# Patient Record
Sex: FEMALE | Race: WHITE | Employment: FULL TIME | URBAN - METROPOLITAN AREA
[De-identification: names, ages, dates, MRNs, and addresses within clinical notes are randomized per-mention and may not be internally consistent; named-entity substitution may affect disease eponyms.]

---

## 2017-03-08 ENCOUNTER — TRANSFERRED RECORDS (OUTPATIENT)
Dept: HEALTH INFORMATION MANAGEMENT | Facility: CLINIC | Age: 22
End: 2017-03-08

## 2017-05-17 ENCOUNTER — TRANSFERRED RECORDS (OUTPATIENT)
Dept: HEALTH INFORMATION MANAGEMENT | Facility: CLINIC | Age: 22
End: 2017-05-17

## 2017-09-03 ENCOUNTER — HEALTH MAINTENANCE LETTER (OUTPATIENT)
Age: 22
End: 2017-09-03

## 2018-02-09 ENCOUNTER — TRANSFERRED RECORDS (OUTPATIENT)
Dept: HEALTH INFORMATION MANAGEMENT | Facility: CLINIC | Age: 23
End: 2018-02-09

## 2019-03-05 ENCOUNTER — PRE VISIT (OUTPATIENT)
Dept: UROLOGY | Facility: CLINIC | Age: 24
End: 2019-03-05

## 2019-03-05 NOTE — TELEPHONE ENCOUNTER
Reason for visit: Establish care for Chaitanya     Relevant information: pt scheduled 3/5/19    Records/imaging/labs/orders: Records not yet requested    Pt called: no need for a call    At Rooming: dip/pvr

## 2019-03-06 ENCOUNTER — PRE VISIT (OUTPATIENT)
Dept: UROLOGY | Facility: CLINIC | Age: 24
End: 2019-03-06

## 2019-03-06 NOTE — TELEPHONE ENCOUNTER
MEDICAL RECORDS REQUEST   [facility]  Urology Clinic  [address] [place] [address]  PHONE: [phone]  Fax: [phone]        FUTURE VISIT INFORMATION                                                   Kimmie Vyas : 1995 scheduled for future visit at [facility]    APPOINTMENT INFORMATION:    Date: 2019    Provider:  JC CHAMBERLAIN    Reason for Visit/Diagnosis: CONSULT FOR UTI    REFERRAL INFORMATION:    Referring provider:  MEENAKSHI MENDOZA    Specialty: NP    Referring providers clinic:  [facility] contact number:  [phone]    RECORDS REQUESTED FOR VISIT                                                     NOTES  STATUS/DETAILS   OFFICE NOTE from referring provider  IN PROCESS   OFFICE NOTE from other specialist  no   DISCHARGE SUMMARY from hospital  no   DISCHARGE REPORT from the ER  no   OPERATIVE REPORT  no   MEDICATION LIST  in process       PRE-VISIT CHECKLIST      Record collection complete If no, please explain REQUEST SENT  APPT MADE 2019 NO TIME TO SENT PATIENT MARNIE, HAVE TO BE SIGN WHEN PATIENT ARRIVE   Appointment appropriately scheduled           (right time/right provider) Yes   MyChart activation If no, please explain IN PROCESS   Questionnaire complete If no, please explain IN PROCESS     Completed by: Jc Leon

## 2019-03-07 ENCOUNTER — OFFICE VISIT (OUTPATIENT)
Dept: UROLOGY | Facility: CLINIC | Age: 24
End: 2019-03-07
Payer: COMMERCIAL

## 2019-03-07 VITALS
WEIGHT: 140.3 LBS | BODY MASS INDEX: 23.38 KG/M2 | DIASTOLIC BLOOD PRESSURE: 66 MMHG | HEIGHT: 65 IN | SYSTOLIC BLOOD PRESSURE: 126 MMHG | HEART RATE: 81 BPM

## 2019-03-07 DIAGNOSIS — Z87.440 PERSONAL HISTORY OF URINARY TRACT INFECTION: Primary | ICD-10-CM

## 2019-03-07 RX ORDER — CIPROFLOXACIN 500 MG/1
500 TABLET, FILM COATED ORAL 2 TIMES DAILY
COMMUNITY
End: 2019-05-13

## 2019-03-07 ASSESSMENT — MIFFLIN-ST. JEOR: SCORE: 1382.28

## 2019-03-07 ASSESSMENT — PAIN SCALES - GENERAL: PAINLEVEL: NO PAIN (0)

## 2019-03-07 NOTE — PROGRESS NOTES
"March 7, 2019    Referring Provider: Self referred    Primary Care Provider: Does not have one    CC: UTI    HPI:  Kimmie Oconnor is a 23 year old female who presents for evaluation of her pelvic floor symptoms.  She reports that her urinary tract symptoms started in February 2017 when she got .    Gets dysuria, gross hematuria, urgency frequency small voids.  Can get nauseated, feverish and chills.  Never hospitalized for one.  She notes that the symptoms are often related to intercourse but not always.  Thinks she has maybe 15 UTIs in the past two years.  She thinks that urine culture was only done about five times.    Was in [place] in November and bought Ciprofloxacin over there and uses it when she has symptoms of UTI    She was seen at [facility] in 2017 and given a \"large jar of nitrofurantoin\"    No prior history of UTI, constipation, pelvic surgeries.  She does not think she could be pregnant but not trying to prevent.  She is hoping to get pregnant within the next year.    No past medical history on file.    No past surgical history on file.    Social History     Socioeconomic History     Marital status:      Spouse name: Not on file     Number of children: Not on file     Years of education: Not on file     Highest education level: Not on file   Occupational History     Not on file   Social Needs     Financial resource strain: Not on file     Food insecurity:     Worry: Not on file     Inability: Not on file     Transportation needs:     Medical: Not on file     Non-medical: Not on file   Tobacco Use     Smoking status: Never Smoker     Smokeless tobacco: Never Used   Substance and Sexual Activity     Alcohol use: Not on file     Drug use: Not on file     Sexual activity: Not on file   Lifestyle     Physical activity:     Days per week: Not on file     Minutes per session: Not on file     Stress: Not on file   Relationships     Social connections:     Talks on phone: Not on file     Gets " "together: Not on file     Attends [employer] service: Not on file     Active member of club or organization: Not on file     Attends meetings of clubs or organizations: Not on file     Relationship status: Not on file     Intimate partner violence:     Fear of current or ex partner: Not on file     Emotionally abused: Not on file     Physically abused: Not on file     Forced sexual activity: Not on file   Other Topics Concern     Parent/sibling w/ CABG, MI or angioplasty before 65F 55M? No   Social History Narrative     Not on file     No family history on file.    Review of Systems     All other systems reviewed and are negative.      No Known Allergies    Current Outpatient Medications   Medication     ciprofloxacin (CIPRO) 500 MG tablet     NO ACTIVE MEDICATIONS     No current facility-administered medications for this visit.      /66   Pulse 81   Ht 1.651 m (5' 5\")   Wt 63.6 kg (140 lb 4.8 oz)   BMI 23.35 kg/m   No LMP recorded. Body mass index is 23.35 kg/m .  She is alert and oriented.  She is well groomed, comfortable in no acute distress. Normal mood and affect.   Non-labored breathing. Normocephalic without masses, lesions, obvious abnormalities. Abdomen is soft, non-tender, non-distended, no CVAT.  .  Skin dry, warm to touch. No lower extremity edema.  Full ROM in extremities with normal gait.      Urine dip not done as she is on antibiotics    PVR 0 mL by bladder scan    A/P: Kimmie Oconnor is a 23 year old F with history of UTI    Recommend that she stop the Ciprofloxacin as she has already been on it for one week.  Discussed that she should be cautious about using this medication if she is considering pregnancy    Discussed the importance of actually doing a urine culture if there is concern for UTI    Renal US to assess for obvious stones or pathology    Discussed given the hematuria it would be reasonable to consider cystoscopy.  We did discuss that given her age the likelihood of malignancy " or anything concerning would be low, but would consider if culture proven infections or symptoms continue    45 minutes were spent with the patient today, > 50% in counseling and coordination of care    Allie Duarte MD MPH    Urology    CC  Patient Care Team:  Allie Duarte MD as MD (Urology)  Georgina Deal, RN as Specialty Care Coordinator (Urology)  SELF, REFERRED

## 2019-03-07 NOTE — LETTER
"  RE: Kimmie Oconnor  [address] [place] [address]     Dear Colleague,    Thank you for referring your patient, Kimmie Oconnor, to the [facility]OLOG[facility] AND [facility] FOR PROSTATE AND UROLOGIC CANCERS at [facility]. Please see a copy of my visit note below.    March 7, 2019    Referring Provider: Self referred    Primary Care Provider: Does not have one    CC: UTI    HPI:  Kimmie Oconnor is a 23 year old female who presents for evaluation of her pelvic floor symptoms.  She reports that her urinary tract symptoms started in February 2017 when she got .    Gets dysuria, gross hematuria, urgency frequency small voids.  Can get nauseated, feverish and chills.  Never hospitalized for one.  She notes that the symptoms are often related to intercourse but not always.  Thinks she has maybe 15 UTIs in the past two years.  She thinks that urine culture was only done about five times.    Was in [place] in November and bought Ciprofloxacin over there and uses it when she has symptoms of UTI    She was seen at [facility] in 2017 and given a \"large jar of nitrofurantoin\"    No prior history of UTI, constipation, pelvic surgeries.  She does not think she could be pregnant but not trying to prevent.  She is hoping to get pregnant within the next year.    No past medical history on file.    No past surgical history on file.    Social History     Socioeconomic History     Marital status:      Spouse name: Not on file     Number of children: Not on file     Years of education: Not on file     Highest education level: Not on file   Occupational History     Not on file   Social Needs     Financial resource strain: Not on file     Food insecurity:     Worry: Not on file     Inability: Not on file     Transportation needs:     Medical: Not on file     Non-medical: Not on file   Tobacco Use     Smoking status: Never Smoker     Smokeless tobacco: Never Used   Substance and Sexual " "Activity     Alcohol use: Not on file     Drug use: Not on file     Sexual activity: Not on file   Lifestyle     Physical activity:     Days per week: Not on file     Minutes per session: Not on file     Stress: Not on file   Relationships     Social connections:     Talks on phone: Not on file     Gets together: Not on file     Attends Confucianism service: Not on file     Active member of club or organization: Not on file     Attends meetings of clubs or organizations: Not on file     Relationship status: Not on file     Intimate partner violence:     Fear of current or ex partner: Not on file     Emotionally abused: Not on file     Physically abused: Not on file     Forced sexual activity: Not on file   Other Topics Concern     Parent/sibling w/ CABG, MI or angioplasty before 65F 55M? No   Social History Narrative     Not on file     No family history on file.    No Known Allergies    Current Outpatient Medications   Medication     ciprofloxacin (CIPRO) 500 MG tablet     NO ACTIVE MEDICATIONS     No current facility-administered medications for this visit.      /66   Pulse 81   Ht 1.651 m (5' 5\")   Wt 63.6 kg (140 lb 4.8 oz)   BMI 23.35 kg/m    No LMP recorded. Body mass index is 23.35 kg/m .  She is alert and oriented.  She is well groomed, comfortable in no acute distress. Normal mood and affect.   Non-labored breathing. Normocephalic without masses, lesions, obvious abnormalities. Abdomen is soft, non-tender, non-distended, no CVAT.  .  Skin dry, warm to touch. No lower extremity edema.  Full ROM in extremities with normal gait.      Urine dip not done as she is on antibiotics    PVR 0 mL by bladder scan    A/P: Kimmie Oconnor is a 23 year old F with history of UTI    Recommend that she stop the Ciprofloxacin as she has already been on it for one week.  Discussed that she should be cautious about using this medication if she is considering pregnancy    Discussed the importance of actually doing a urine " culture if there is concern for UTI    Renal US to assess for obvious stones or pathology    Discussed given the hematuria it would be reasonable to consider cystoscopy.  We did discuss that given her age the likelihood of malignancy or anything concerning would be low, but would consider if culture proven infections or symptoms continue    45 minutes were spent with the patient today, > 50% in counseling and coordination of care    Allie Duarte MD MPH    Urology    CC  Patient Care Team:  Allie Duarte MD as MD (Urology)  Georgina Deal, RN as Specialty Care Coordinator (Urology)  SELF, REFERRED

## 2019-03-07 NOTE — PATIENT INSTRUCTIONS
Would recommend that you stop the Ciprofloxacin    Supplements to prevent UTI  -probiotics example Align  -cranberry   Ellura: [email or web address]   Theracran HP by [employer]  -d-mannose    Please do the renal ultrasound    Please let us know if you have a urine infection and come in for a urine test to a  [facility] lab preferably   [phone] or [phone] during business hours  [phone] and ask for the urology resident on call    It was a pleasure meeting with you today.  Thank you for allowing me and my team the privilege of caring for you today.  YOU are the reason we are here, and I truly hope we provided you with the excellent service you deserve.  Please let us know if there is anything else we can do for you so that we can be sure you are leaving completely satisfied with your care experience.    Cystoscopy    Cystoscopy is a procedure that lets your doctor look directly inside your urethra and bladder. It can be used to:    Help diagnose a problem with your urethra, bladder, or kidneys.    Take a sample (biopsy) of bladder or urethral tissue.    Treat certain problems (such as removing kidney stones).    Place a stent to bypass an obstruction.    Take special X-rays of the kidneys.  Based on the findings, your doctor may recommend other tests or treatments.  What is a cystoscope?  A cystoscope is a telescope-like instrument that contains lenses and fiberoptics (small glass wires that make bright light). The cystoscope may be straight and rigid, or flexible to bend around curves in the urethra. The doctor may look directly into the cystoscope, or project the image onto a monitor.  Getting ready    Ask your doctor if you should stop taking any medicines before the procedure.    Follow any other instructions your doctor gives you.  Tell your doctor before the exam if you:    Take any medicines, such as aspirin or blood thinners    Have allergies to any medicines    Are pregnant   The  procedure  Cystoscopy is done in the doctor s office, surgery center, or hospital. The doctor and a nurse are present during the procedure. It takes only a few minutes, longer if a biopsy, X-ray, or treatment needs to be done.  During the procedure:    You lie on an exam table on your back, knees bent and legs apart. You are covered with a drape.    Your urethra and the area around it are washed. Anesthetic jelly may be applied to numb the urethra.    The cystoscope is inserted. A sterile fluid is put into the bladder to expand it. You may feel pressure from this fluid.    When the procedure is done, the cystoscope is removed.  After the procedure   Once you re home:    Drink plenty of fluids.    You may have burning or light bleeding when you urinate--this is normal.    Medicines may be prescribed to ease any discomfort or prevent infection. Take these as directed.    Call your doctor if you have heavy bleeding or blood clots, burning that lasts more than a day, a fever over 100 F  (38  C), or trouble urinating.  Date Last Reviewed: 1/1/2017 2000-2017 [employer]. [address] [place] [address]. All rights reserved. This information is not intended as a substitute for professional medical care. Always follow your healthcare professional's instructions.

## 2019-03-07 NOTE — NURSING NOTE
"Chief Complaint   Patient presents with     Consult     Chaitanya consult       Blood pressure 126/66, pulse 81, height 1.651 m (5' 5\"), weight 63.6 kg (140 lb 4.8 oz), not currently breastfeeding. Body mass index is 23.35 kg/m .    There is no problem list on file for this patient.      No Known Allergies    Current Outpatient Medications   Medication Sig Dispense Refill     ciprofloxacin (CIPRO) 500 MG tablet Take 500 mg by mouth 2 times daily       NO ACTIVE MEDICATIONS          Social History     Tobacco Use     Smoking status: Never Smoker     Smokeless tobacco: Never Used   Substance Use Topics     Alcohol use: Not on file     Drug use: Not on file       ANN Aguilar  3/7/2019  8:12 AM     "

## 2019-03-13 ENCOUNTER — ANCILLARY PROCEDURE (OUTPATIENT)
Dept: ULTRASOUND IMAGING | Facility: CLINIC | Age: 24
End: 2019-03-13
Attending: UROLOGY
Payer: COMMERCIAL

## 2019-03-13 DIAGNOSIS — Z87.440 PERSONAL HISTORY OF URINARY TRACT INFECTION: ICD-10-CM

## 2019-03-13 PROCEDURE — 76770 US EXAM ABDO BACK WALL COMP: CPT | Performed by: RADIOLOGY

## 2019-03-25 ENCOUNTER — TELEPHONE (OUTPATIENT)
Dept: UROLOGY | Facility: CLINIC | Age: 24
End: 2019-03-25

## 2019-03-25 DIAGNOSIS — N39.0 URINARY TRACT INFECTION WITHOUT HEMATURIA, SITE UNSPECIFIED: Primary | ICD-10-CM

## 2019-03-25 DIAGNOSIS — N39.0 URINARY TRACT INFECTION: ICD-10-CM

## 2019-03-25 DIAGNOSIS — N39.0 URINARY TRACT INFECTION WITHOUT HEMATURIA, SITE UNSPECIFIED: ICD-10-CM

## 2019-03-25 LAB

## 2019-03-25 PROCEDURE — 81001 URINALYSIS AUTO W/SCOPE: CPT | Performed by: UROLOGY

## 2019-03-25 PROCEDURE — 87086 URINE CULTURE/COLONY COUNT: CPT | Performed by: UROLOGY

## 2019-03-25 PROCEDURE — 87109 MYCOPLASMA: CPT | Performed by: UROLOGY

## 2019-03-25 PROCEDURE — 87109 MYCOPLASMA: CPT | Mod: 91 | Performed by: UROLOGY

## 2019-03-25 NOTE — TELEPHONE ENCOUNTER
[facility] Call Center    Phone Message    May a detailed message be left on voicemail: yes    Reason for Call: Other: Pt calling asking for UA/UC orders to be placed. She would appreciate a call back when orders have been submitted so she can schedule.     Action Taken: Message routed to:  [facility] UC URO AND PROSTATE

## 2019-03-26 DIAGNOSIS — N39.0 URINARY TRACT INFECTION: Primary | ICD-10-CM

## 2019-03-26 LAB
BACTERIA SPEC CULT: NO GROWTH
SPECIMEN SOURCE: NORMAL

## 2019-03-27 ENCOUNTER — TELEPHONE (OUTPATIENT)
Dept: UROLOGY | Facility: CLINIC | Age: 24
End: 2019-03-27

## 2019-03-27 NOTE — TELEPHONE ENCOUNTER
Patient would like to know if she can wait until her labs are done and see what the results are and decide if she should continue with having the ct urogram done.     Georgina Deal RN   Care Coordinator Urology

## 2019-03-27 NOTE — TELEPHONE ENCOUNTER
----- Message from Allie Duarte MD sent at 3/27/2019  1:34 PM CDT -----  Renal US is normal but I am unsure what to make of the length discrepancy in but do not think that it is contributing to her pain.  Her urine tests don't seem to be all back yet .    Otherwise we could consider CT urogram and return for office cystoscopy    ----- Message -----  From: Georgina Deal RN  Sent: 3/27/2019   1:29 PM  To: Allie Duarte MD    Did you look at the her COCO? Done 3/7/19  She is still in pain and wants to know what we are doing.  Please review    Let me know  Georgina

## 2019-03-27 NOTE — TELEPHONE ENCOUNTER
Patient was notified by christopher Deal RNCC for Dr. Allie Duarte that her UC was normal.      Tiffanie Chacko MA

## 2019-04-03 LAB
BACTERIA SPEC CULT: NORMAL
BACTERIA SPEC CULT: NORMAL
SPECIMEN SOURCE: NORMAL
SPECIMEN SOURCE: NORMAL

## 2019-04-04 DIAGNOSIS — N39.0 RECURRENT UTI: Primary | ICD-10-CM

## 2019-04-04 NOTE — PROGRESS NOTES
Patient notified of ct urogram and appt  To figure out why she has green urine and uti symptoms.  She was updated on her negative myco-ureaplasma. Patient agreed with plan.    Georgina Deal RN   Care Coordinator Urology

## 2019-05-13 ENCOUNTER — OFFICE VISIT (OUTPATIENT)
Dept: PEDIATRICS | Facility: CLINIC | Age: 24
End: 2019-05-13
Payer: COMMERCIAL

## 2019-05-13 VITALS
WEIGHT: 139.8 LBS | TEMPERATURE: 98.8 F | HEIGHT: 65 IN | BODY MASS INDEX: 23.29 KG/M2 | SYSTOLIC BLOOD PRESSURE: 126 MMHG | OXYGEN SATURATION: 98 % | RESPIRATION RATE: 18 BRPM | HEART RATE: 102 BPM | DIASTOLIC BLOOD PRESSURE: 70 MMHG

## 2019-05-13 DIAGNOSIS — J06.9 UPPER RESPIRATORY TRACT INFECTION, UNSPECIFIED TYPE: ICD-10-CM

## 2019-05-13 DIAGNOSIS — Z32.00 PREGNANCY EXAMINATION OR TEST, PREGNANCY UNCONFIRMED: Primary | ICD-10-CM

## 2019-05-13 LAB — HCG UR QL: POSITIVE

## 2019-05-13 PROCEDURE — 81025 URINE PREGNANCY TEST: CPT | Performed by: FAMILY MEDICINE

## 2019-05-13 PROCEDURE — 99213 OFFICE O/P EST LOW 20 MIN: CPT | Performed by: FAMILY MEDICINE

## 2019-05-13 RX ORDER — PRENATAL VIT/IRON FUM/FOLIC AC 27MG-0.8MG
1 TABLET ORAL DAILY
Qty: 30 TABLET | Refills: 11 | COMMUNITY
Start: 2019-05-13

## 2019-05-13 ASSESSMENT — MIFFLIN-ST. JEOR: SCORE: 1385.62

## 2019-05-13 ASSESSMENT — PAIN SCALES - GENERAL: PAINLEVEL: NO PAIN (0)

## 2019-05-13 NOTE — PATIENT INSTRUCTIONS

## 2019-05-13 NOTE — PROGRESS NOTES
"  SUBJECTIVE:   Kimmie Oconnor is a 23 year old female who presents to clinic today for the following   health issues:          Pregnancy confirmation on today visit.    Pt states that she is here to confirm a pregnancy test on today visit. Pt states that her last pregnancy test at home was done the 05/10/2019. Pt states that her last period or menstrual cycle was 04/10/2019 on today visit.      She also complains of tiredness, rhinorrhea, without cough, fever or chills. Appetite is a little decreased but urine output is within normal limits.    Additional history: as documented    Reviewed  and updated as needed this visit by clinical staff  Tobacco  Allergies  Meds  Problems  Med Hx  Surg Hx  Fam Hx         Reviewed and updated as needed this visit by Provider  Tobacco  Allergies  Meds  Problems  Med Hx  Surg Hx  Fam Hx         There is no problem list on file for this patient.    History reviewed. No pertinent surgical history.    Social History     Tobacco Use     Smoking status: Never Smoker     Smokeless tobacco: Never Used   Substance Use Topics     Alcohol use: Not on file     History reviewed. No pertinent family history.      Current Outpatient Medications   Medication Sig Dispense Refill     Prenatal Vit-Fe Fumarate-FA (PRENATAL MULTIVITAMIN W/IRON) 27-0.8 MG tablet Take 1 tablet by mouth daily 30 tablet 11     NO ACTIVE MEDICATIONS          ROS:  Constitutional, HEENT, cardiovascular, pulmonary, Neuro, dermatology,  gi and gu systems are negative, except as otherwise noted.    OBJECTIVE:     /70 (BP Location: Right arm, Patient Position: Sitting, Cuff Size: Adult Large)   Pulse 102   Temp 98.8  F (37.1  C) (Temporal)   Resp 18   Ht 1.66 m (5' 5.35\")   Wt 63.4 kg (139 lb 12.8 oz)   SpO2 98%   BMI 23.01 kg/m    Body mass index is 23.01 kg/m .   GENERAL: healthy, alert and no distress  EYES: Eyes grossly normal to inspection, PERRL and conjunctivae and sclerae normal  HENT: ear " canals and TM's normal, nose and mouth without ulcers or lesions  NECK: no adenopathy, no asymmetry, masses, or scars and thyroid normal to palpation  RESP: lungs clear to auscultation - no rales, rhonchi or wheezes  CV: regular rate and rhythm, normal S1 S2, no S3 or S4, no murmur, click or rub, no peripheral edema and peripheral pulses strong  ABDOMEN: soft, nontender, no hepatosplenomegaly, no masses and bowel sounds normal      ASSESSMENT/PLAN:   Kimmie was seen today for recheck.    Diagnoses and all orders for this visit:    Pregnancy examination or test, pregnancy unconfirmed  -     HCG Qual, Urine ([id number])  Prenatal vitamins daily.  Given first trimester precautions/warning signs.  Encouraged healthy lifestyle and avoidance of alcohol, drugs, and caffeine.  All questions answered.  Given all options for OB care, including family practice, OB/Gyn, and midwives.  Her first OB appointment should optimally be between 8 and 12 weeks ( patient has an already scheduled 10 week first OB visit)  Call or come in for concerns that arise sooner.     Upper respiratory tract infection, unspecified type  Symptomatic therapy suggested: push fluids, use vaporizer or mist needed  and use acetaminophen as needed. Discussed safe use of cetirizine or loratadine.        James Man MD  [facility]

## 2019-05-23 ENCOUNTER — TELEPHONE (OUTPATIENT)
Dept: OBGYN | Facility: OTHER | Age: 24
End: 2019-05-23

## 2019-05-23 DIAGNOSIS — R35.0 URINARY FREQUENCY: ICD-10-CM

## 2019-05-23 NOTE — TELEPHONE ENCOUNTER
I placed an order for a urine test.      Also:  Treatment of Nausea and Vomiting in Pregnancy    Stop prenatal vitamin and start a folic acid supplement only until nausea improves (folic acid 400 micrograms by mouth daily)    Ginger capsules 250 mg by mouth daily     Consider acupressure with wrist bands    Vitamin B6 (pyridoxine) 10-25 mg by mouth 3-4 times per day.  This may be taken in combination with doxylamine.  Doxylamine 25 mg by mouth every night before bed.  You may also take doxylamine 12.5 mg (half tab) in the am and in the afternoon as needed.      It is important to stay hydrated.  Please let us know if your symptoms worsen or do not improve with the treatment plan listed above.

## 2019-05-23 NOTE — TELEPHONE ENCOUNTER
Called pt to discuss Dr. Mayfield note. Pt verbalized understanding and had no further questions or concerns.   Writer scheduled pt for her lab only appointment.      Nayana Bran RN on 5/23/2019 at 1:47 PM

## 2019-05-23 NOTE — TELEPHONE ENCOUNTER
Nausea, can eat crackers and drink sprite and water.   Has thrown up 6x/3days. Can keep food and liquid down.   Discussed small frequent meals, bland foods, what foods to avoid.    Pt has had UTI symptoms for years they come and go. Pt saw a Dr. Duarte at the [facility], Urology.   pt didn't have an UTI at the most recently urine testing 3/25/2019. Dr. Duarte advised that pt get an MRI since Chronic problem for pt. Since pt is pregnant she cannot do this.     Itching and burning when pt pee's symptoms are improving. Symptoms were worse this past Monday.   Routing to provider to advise on a lab only appointment?     Nayana Bran RN on 5/23/2019 at 11:48 AM

## 2019-05-24 DIAGNOSIS — R35.0 URINARY FREQUENCY: ICD-10-CM

## 2019-05-24 LAB
ALBUMIN UR-MCNC: 10 MG/DL
APPEARANCE UR: CLEAR
BACTERIA #/AREA URNS HPF: ABNORMAL /HPF
BILIRUB UR QL STRIP: NEGATIVE
COLOR UR AUTO: YELLOW
GLUCOSE UR STRIP-MCNC: NEGATIVE MG/DL
HGB UR QL STRIP: ABNORMAL
KETONES UR STRIP-MCNC: 10 MG/DL
LEUKOCYTE ESTERASE UR QL STRIP: NEGATIVE
MUCOUS THREADS #/AREA URNS LPF: PRESENT /LPF
NITRATE UR QL: NEGATIVE
NON-SQ EPI CELLS #/AREA URNS LPF: ABNORMAL /LPF
PH UR STRIP: 6 PH (ref 5–7)
RBC #/AREA URNS AUTO: ABNORMAL /HPF
SOURCE: ABNORMAL
SP GR UR STRIP: 1.01 (ref 1–1.03)
UROBILINOGEN UR STRIP-MCNC: NORMAL MG/DL (ref 0–2)
WBC #/AREA URNS AUTO: ABNORMAL /HPF

## 2019-05-24 PROCEDURE — 87086 URINE CULTURE/COLONY COUNT: CPT | Performed by: OBSTETRICS & GYNECOLOGY

## 2019-05-24 PROCEDURE — 81001 URINALYSIS AUTO W/SCOPE: CPT | Performed by: OBSTETRICS & GYNECOLOGY

## 2019-05-24 NOTE — TELEPHONE ENCOUNTER
Patient called today stating she vomited up the medication she took this morning. Patient has recently switched medications per Dr. Wise's recommendations and stopped the prenatal vitamin and started a folic acid supplement only until nausea improves, Brenda capsules, and Vitamin B6. She was unable to to find the recommended 250 mg brenda capsules so Dr. Wise ok'd the 550 mg that she found OTC.     RN gave recommendations to not take medication again today and let her stomach rest. She could try taking the medications again tomorrow with the recommendations of eating small amount of food, take small sips of water and not taking all the medications at once. She also states she hungry at night, told her she could keep crackers at her bedside.    Patient also had concerns of constipation. RN recommended staying hydrated, staying active, eating fruits and veggies as tolerated and told her she could try colace or senna as well.     Patient verbalized understanding and agreed to plan.   No further questions.     Porsche Aguero RN on 5/24/2019 at 9:26 AM

## 2019-05-25 LAB
BACTERIA SPEC CULT: NORMAL
SPECIMEN SOURCE: NORMAL

## 2019-05-30 ENCOUNTER — TELEPHONE (OUTPATIENT)
Dept: OBGYN | Facility: OTHER | Age: 24
End: 2019-05-30

## 2019-05-30 DIAGNOSIS — O21.9 NAUSEA AND VOMITING IN PREGNANCY: Primary | ICD-10-CM

## 2019-05-30 NOTE — TELEPHONE ENCOUNTER
"Patient is calling again today with continued nausea and vomiting. RN spoke with patient last week with recards to her vomiting. Dr. Wise made recommendations to switch up medications (stopped the prenatal vitamin and started a folic acid supplement only until nausea improves, Brenda capsules, and Vitamin B6) and patient states she was feeling\"somehwat candido\" over the weekend, then the last two days she was having \"normal morning sickness\" where is was nauseated and vomiting until 10 am and then just nauseated. However, since dinner last night she's been increasingly nauseated, and since 6 am this morning she has been vomiting. In the 10 minutes on the phone with RN this afternoon she vomited. Patient states she had started taking her prenatal vitamin again before bed so that could possibly be contributing. Today she has tried taking small sips of water, eating bland foods and crackers throughout the day but cannot keep anything down. She states she is \"nervous that she may pass out\". She is mildly fatigued, not able to keep down medications, urine is pale in color. Is is thirsty but denies dry mouth or skin upon her assessment.      RN recommends that patient go to ED to be further assessed for dehydration and see if they can prescribe anything to help further with nausea and vomiting. RN will route concerns to Dr. Wise as well for further recommendations.      Patient verbalized understanding and agreed to plan.   No further questions.      Porsche Aguero RN on 5/30/2019 at 5:01 PM      "

## 2019-05-31 RX ORDER — ONDANSETRON 4 MG/1
4 TABLET, ORALLY DISINTEGRATING ORAL EVERY 8 HOURS PRN
Qty: 20 TABLET | Refills: 1 | Status: SHIPPED | OUTPATIENT
Start: 2019-05-31

## 2019-05-31 NOTE — TELEPHONE ENCOUNTER
This was sent in.  Please notify patient.    Zofran can be quite constipating so I recommend she consider a stool softener and stay well hydrated.  Zofran is commonly used for nausea in pregnancy, but has not been well studied.  She should use it sparingly and continue the other care measures first as previously discussed.

## 2019-05-31 NOTE — TELEPHONE ENCOUNTER
Pt went to ER last night, received IV fluids x1, and IV Zofran.   Pt feels better now, pt is able to eat and keep food and water down so far this morning. Advised pt to stop taking prenatal per Dr. Wise's request.     Pt would like Zofran, please send to selected pharmacy.   Routing to provider to add order of Zofran.     Nayana Bran RN on 5/31/2019 at 8:06 AM

## 2019-05-31 NOTE — TELEPHONE ENCOUNTER
Called pt inform her that Dr. Wise has sent her prescription for Zofran. Writer also informed pt of the rest of Dr. Wise's note.   Writer advised trying seabands as well, aromatherapy such as mandarin, silvia, and mint. Pt verbalized understanding and had no further questions or concerns.     Nayana Bran RN on 5/31/2019 at 9:41 AM

## 2019-05-31 NOTE — TELEPHONE ENCOUNTER
If patient recently restarted her prenatal she should stop.  Please call patient and see how she is doing.  If she is interested, I can send in a prescription for nausea medication.

## 2019-05-31 NOTE — TELEPHONE ENCOUNTER
Patient called stating she has taken Zofran once today at 1400. She then ate right after taking medication at 1500 and 1600 and then proceeded to puke twice today after each meal.    Patient tried eating crackers,  bland nuts, tried fruit smoothie (pineapple, banana, canalope), hard whites of eggs, has been having water. Recommended spacing meals out and eating small portions. Advised not to take double dose of Zofran like patient wanted to. Told her Zofran should help with the nausea and vomiting but it's a cure-all. Told her to rest, and try the previous recommendations over the weekend and then call and update us on Monday. If no improvement we would reach out to provider again for further recommendations. Discussed that patient may be one who experiences hyperemesis throughout pregnancy and that it is important to stay hydrated and rest as able.     Patient verbalized understanding and agreed to plan.   Will update us on Monday June 3rd.     Porsche Aguero RN on 5/31/2019 at 5:14 PM

## 2019-06-03 ENCOUNTER — PRE VISIT (OUTPATIENT)
Dept: UROLOGY | Facility: CLINIC | Age: 24
End: 2019-06-03

## 2019-06-03 ENCOUNTER — TELEPHONE (OUTPATIENT)
Dept: OBGYN | Facility: OTHER | Age: 24
End: 2019-06-03

## 2019-06-03 NOTE — TELEPHONE ENCOUNTER
Pt calling to let us know pt is feeling fine. Pt felt better this weekend and is starting to feel like herself.     Nayana Bran RN on 6/3/2019 at 11:58 AM

## 2019-06-03 NOTE — TELEPHONE ENCOUNTER
Reason for visit: 3 month follow up     Relevant information: Pelvic floor symptoms    Records/imaging/labs/orders: pt is pregnant     Pt called: no need for a call    At Rooming: regular

## 2019-06-06 ENCOUNTER — TELEPHONE (OUTPATIENT)
Dept: OBGYN | Facility: OTHER | Age: 24
End: 2019-06-06

## 2019-06-06 ENCOUNTER — OFFICE VISIT (OUTPATIENT)
Dept: OBGYN | Facility: OTHER | Age: 24
End: 2019-06-06
Payer: COMMERCIAL

## 2019-06-06 VITALS
SYSTOLIC BLOOD PRESSURE: 118 MMHG | BODY MASS INDEX: 21.81 KG/M2 | DIASTOLIC BLOOD PRESSURE: 60 MMHG | WEIGHT: 132.5 LBS | HEART RATE: 72 BPM

## 2019-06-06 DIAGNOSIS — R82.90 NONSPECIFIC FINDING ON EXAMINATION OF URINE: ICD-10-CM

## 2019-06-06 DIAGNOSIS — R30.0 DYSURIA: Primary | ICD-10-CM

## 2019-06-06 DIAGNOSIS — Z32.01 PREGNANCY TEST POSITIVE: ICD-10-CM

## 2019-06-06 LAB
ALBUMIN UR-MCNC: NEGATIVE MG/DL
AMORPH CRY #/AREA URNS HPF: ABNORMAL /HPF
APPEARANCE UR: ABNORMAL
BACTERIA #/AREA URNS HPF: ABNORMAL /HPF
BILIRUB UR QL STRIP: NEGATIVE
COLOR UR AUTO: YELLOW
GLUCOSE UR STRIP-MCNC: NEGATIVE MG/DL
HGB UR QL STRIP: ABNORMAL
KETONES UR STRIP-MCNC: NEGATIVE MG/DL
LEUKOCYTE ESTERASE UR QL STRIP: ABNORMAL
NITRATE UR QL: POSITIVE
NON-SQ EPI CELLS #/AREA URNS LPF: ABNORMAL /LPF
PH UR STRIP: 6.5 PH (ref 5–7)
RBC #/AREA URNS AUTO: ABNORMAL /HPF
SOURCE: ABNORMAL
SP GR UR STRIP: 1.01 (ref 1–1.03)
UROBILINOGEN UR STRIP-ACNC: 0.2 EU/DL (ref 0.2–1)
WBC #/AREA URNS AUTO: ABNORMAL /HPF

## 2019-06-06 PROCEDURE — 87186 SC STD MICRODIL/AGAR DIL: CPT | Performed by: ADVANCED PRACTICE MIDWIFE

## 2019-06-06 PROCEDURE — 99213 OFFICE O/P EST LOW 20 MIN: CPT | Performed by: ADVANCED PRACTICE MIDWIFE

## 2019-06-06 PROCEDURE — 87088 URINE BACTERIA CULTURE: CPT | Performed by: ADVANCED PRACTICE MIDWIFE

## 2019-06-06 PROCEDURE — 81001 URINALYSIS AUTO W/SCOPE: CPT | Performed by: ADVANCED PRACTICE MIDWIFE

## 2019-06-06 PROCEDURE — 87086 URINE CULTURE/COLONY COUNT: CPT | Performed by: ADVANCED PRACTICE MIDWIFE

## 2019-06-06 RX ORDER — AMPICILLIN TRIHYDRATE 500 MG
500 CAPSULE ORAL 4 TIMES DAILY
Qty: 20 CAPSULE | Refills: 0 | Status: SHIPPED | OUTPATIENT
Start: 2019-06-06 | End: 2019-06-11

## 2019-06-06 NOTE — NURSING NOTE
"Chief Complaint   Patient presents with     Dysuria     recurrent UTI symptoms       Initial /60 (BP Location: Right arm, Patient Position: Sitting, Cuff Size: Adult Regular)   Pulse 72   Wt 60.1 kg (132 lb 8 oz)   LMP 04/10/2019 (Exact Date)   BMI 21.81 kg/m   Estimated body mass index is 21.81 kg/m  as calculated from the following:    Height as of 5/13/19: 1.66 m (5' 5.35\").    Weight as of this encounter: 60.1 kg (132 lb 8 oz).  Medication Reconciliation: complete    Bel Hernandez CMA    "

## 2019-06-06 NOTE — PATIENT INSTRUCTIONS
Thank for choosing our clinic for your health care needs. Our goal is to provided you with excellent care. One way that we continue to improve our care is by listening to our patients. Please take a few minutes to complete the written survey that you may receive in the mail after your visit.     You may reach your care team by calling the following number:    [place]- [phone]  [place] [phone]  For clinic hours at [facility]  please visit the [facility] web site [email or web address]    Notification of your lab results:  Normal or non-critical lab and imaging results will be communicated to you by Mychart, letter, or phone within 7 days. If you do not hear from us within 10 days, please contact us through Graphiclyhart or phone. If you have a critical or abnormal lab result, we will notify you by phone as soon as possible.  Pap smears often take a bit longer.     After Hours nurse advice:  [facility]:  [phone]     Medication Refills:  Please contact your pharmacy and they will forward the refill to us. Please allow 3 business days for your refills to be completed.     Secure access to your medical record:  Use E[employer]us (secure email communication and access to your chart) to send your primary care provider a message or make an appointment. Ask someone on your Team how to sign up for E[employer]us. To log on to Dada or for more information in [employer]us please visit the website at [email or web address].            How to prevent CMV or cytomegalovirus infection while you are pregnant:    Thoroughly wash your hands with soap and warm water especially after doing things such as:  Diaper changes  Feeding or bathing a child  Wiping a child's runny nose or drool  Handling a child's toys    Do not share cups, plates, utensils, toothbrushes or food with your children  Do not kiss young children on the mouth or cheek. Instead, kiss them on the head or give them a hug.  Do not share towels or  washcloths with young children.  Clean toy, counter tops, and other surfaces that come in contact with urine or saliva.        LISTERIA  Individuals can reduce the risk for listeria contamination through proper food selection, handling, and storage, such as:  Rinsing raw produce (fuits and vegetables) before eating, cutting, or cooking;  Keeping refrigerators at 40 degrees F or lower;  Buying soft cheeses only if their labels state that they are made with pasteurized milk.  Also avoiding cheese that has not been initially wrapped in plastic.  These cheeses include brie, camembert, blue and the soft Mexican cheeses like con queso.  Heating all food that can be heated but especially hot dogs, luncheon meats, and cold cuts to an internal temperature of 165 degrees F or until steaming hot before serving them; and  Washing your hands for at least 20 seconds with warm water and soap before and after handling cantaloupes or other melons.  Watch for food recalls for listeria and contact us if you believe you have been exposed.               First line remedies for nausea in pregnancy    Eat small frequent meals every 2-3 hours if possible.   Avoid food at extremes of temparture and drinks with carbination.  Eat foods that appeal to you, avoiding fats and spicy foods.  Bread, pasta, crackers, potatoes, and rice tend to be tolerated the best.  Don't worry about what you eat in the first 3 months, it is more important that you can eat and keep it down.   Try flat ginger ale.  Ginger is a herbal remedy for nausea and you can use it in any form.  There are ginger tablets you can purchase.  The dose is 500 to 1000 mg a day.   You may also try doxylamine 12.5 mg three times a day which is a sleeping medication along with Vitamin B6 25 mg three times a day.  This combination takes up to a week to work so give it some time.  Be sure to take both the doxylamine and B6  Doxylamine is sometimes called Unisom and it comes in 25 mg  tablets so you will have to break it in half and take half a tablet.   It is important to take the Unisom and B6 together or it won't be effective.  If you begin to vomit more than 5 or 6 times a day and feel that you are unable to keep anything down, call the clinic for an appointment to be seen.                Fruits and vegetables high in pesticide contamination  Strawberries  Spinach  Nectarines  Peaches  Apples  Grapes  Cherries  Pears  Tomatoes  Celery  Potatoes  Sweet Peppers.     Consider extra washing of these fruits and vegetables, peeling if possible or purchasing organics.     Fruits and vegetables lowest if pesticide contramination:  Avocado  Sweet corn  Pineapple  Cabbage   Onions   Frozen peas  Papaya  Asparagus  Yandel  Eggplant  Honeydew  Kiwi  Cantaloupe  Cauliflower  Broccoli      Consider eliminating or reducing the following additives in personal care products and make up:  Triclosan  Paraben  Phthalates  Phenols  Products that do not contain these additives are often found in the organic or green sections of stores.

## 2019-06-06 NOTE — TELEPHONE ENCOUNTER
Pt is 8 weeks pregnant. Experiencing UTI symptoms, three weeks ago had urine test done on  5/24/2019 and it came back negative. Since then the symptoms haven't gotten any better. A lot of pain while trying to urinate, strong smell.   Pt wanting to be seen, writer was able to schedule pt today with Danielle Florence at 1600, in [place]. Pt was very pleased. Pt has first OB appointment with Dr. Wise June 14 2019    Routing to provider as an FYI and if she wants to do an urine test.       Nayana Bran RN on 6/6/2019 at 11:56 AM

## 2019-06-06 NOTE — PROGRESS NOTES
SUBJECTIVE:    Kimmie Oconnor is a 23 year old female who presents today with 2 week(s) of dysuria and frequency.   Having nausea and vomiting related to pregnancy  UTI HX: frequent.  ADDITIONAL SX: none    Patient Active Problem List   Diagnosis     Urinary frequency     Past Medical History:   Diagnosis Date     NO ACTIVE PROBLEMS      History reviewed. No pertinent surgical history.  Current Outpatient Medications   Medication Sig Dispense Refill     ampicillin (PRINCIPEN) 500 MG capsule Take 1 capsule (500 mg) by mouth 4 times daily for 5 days 20 capsule 0     ondansetron (ZOFRAN-ODT) 4 MG ODT tab Take 1 tablet (4 mg) by mouth every 8 hours as needed for nausea 20 tablet 1     Prenatal Vit-Fe Fumarate-FA (PRENATAL MULTIVITAMIN W/IRON) 27-0.8 MG tablet Take 1 tablet by mouth daily 30 tablet 11     No Known Allergies      ROS:   ROS: 10 point ROS neg other than the symptoms noted above in the HPI.    EXAM  /60 (BP Location: Right arm, Patient Position: Sitting, Cuff Size: Adult Regular)   Pulse 72   Wt 60.1 kg (132 lb 8 oz)   LMP 04/10/2019 (Exact Date)   BMI 21.81 kg/m    Patient appears well, afebrile.   Urine dip shows   Color Urine (no units)   Date Value   06/06/2019 Yellow     Appearance Urine (no units)   Date Value   06/06/2019 Slightly Cloudy     Glucose Urine (mg/dL)   Date Value   06/06/2019 Negative     Bilirubin Urine (no units)   Date Value   06/06/2019 Negative     Ketones Urine (mg/dL)   Date Value   06/06/2019 Negative     Specific Gravity Urine (no units)   Date Value   06/06/2019 1.010     pH Urine (pH)   Date Value   06/06/2019 6.5     Protein Albumin Urine (mg/dL)   Date Value   06/06/2019 Negative     Urobilinogen Urine (EU/dL)   Date Value   06/06/2019 0.2     Nitrite Urine (no units)   Date Value   06/06/2019 Positive (A)     Leukocyte Esterase Urine (no units)   Date Value   06/06/2019 Moderate (A)     microscopic exam: 5-10 WBC's per HPF, 2-5 RBC's per HPF and moderate Epi  cells/HPF.      ASSESSMENT: uncomplicated UTI with incidental pregnancy    PLAN:   (R30.0) Dysuria  (primary encounter diagnosis)  Comment:   Plan: *UA reflex to Microscopic and Culture ([place]         and [facility] (except [place] and         [place]), Urine Microscopic, ampicillin         (PRINCIPEN) 500 MG capsule            (R82.90) Nonspecific finding on examination of urine  Comment:   Plan: Urine Culture Aerobic Bacterial              Urine was sent for culture.   Push fluids    Medications per orders in [facility].  May use Uristat or other OTC med for dysuria  Reinforced the importance of completing this course of antibiotics   RTC with continued symptoms or concerns.  Reviewed methods to decrease incidence of UTI          Kimmie Oconnor is a 23 year old who presents to the clinic for confirmation of pregnancy.   Patient's last menstrual period was 04/10/2019 (exact date).  Normally menses are every 28 days        Patient Active Problem List   Diagnosis     Urinary frequency     Past Medical History:   Diagnosis Date     NO ACTIVE PROBLEMS      History reviewed. No pertinent surgical history.  Current Outpatient Medications   Medication Sig Dispense Refill     ampicillin (PRINCIPEN) 500 MG capsule Take 1 capsule (500 mg) by mouth 4 times daily for 5 days 20 capsule 0     ondansetron (ZOFRAN-ODT) 4 MG ODT tab Take 1 tablet (4 mg) by mouth every 8 hours as needed for nausea 20 tablet 1     Prenatal Vit-Fe Fumarate-FA (PRENATAL MULTIVITAMIN W/IRON) 27-0.8 MG tablet Take 1 tablet by mouth daily 30 tablet 11     No Known Allergies  Social History     Socioeconomic History     Marital status:      Spouse name: Not on file     Number of children: Not on file     Years of education: Not on file     Highest education level: Not on file   Occupational History     Not on file   Social Needs     Financial resource strain: Not on file     Food insecurity:     Worry: Not on file     Inability: Not on file      Transportation needs:     Medical: Not on file     Non-medical: Not on file   Tobacco Use     Smoking status: Never Smoker     Smokeless tobacco: Never Used   Substance and Sexual Activity     Alcohol use: Not Currently     Drug use: Never     Sexual activity: Yes     Partners: Male     Birth control/protection: None   Lifestyle     Physical activity:     Days per week: Not on file     Minutes per session: Not on file     Stress: Not on file   Relationships     Social connections:     Talks on phone: Not on file     Gets together: Not on file     Attends [employer] service: Not on file     Active member of club or organization: Not on file     Attends meetings of clubs or organizations: Not on file     Relationship status: Not on file     Intimate partner violence:     Fear of current or ex partner: Not on file     Emotionally abused: Not on file     Physically abused: Not on file     Forced sexual activity: Not on file   Other Topics Concern     Parent/sibling w/ CABG, MI or angioplasty before 65F 55M? No   Social History Narrative     Not on file     History reviewed. No pertinent family history.     ROS: 10 point ROS neg other than the symptoms noted above in the HPI.          /60 (BP Location: Right arm, Patient Position: Sitting, Cuff Size: Adult Regular)   Pulse 72   Wt 60.1 kg (132 lb 8 oz)   LMP 04/10/2019 (Exact Date)   BMI 21.81 kg/m            ASSESSMENT:        (R30.0) Dysuria  (primary encounter diagnosis)  Comment:   Plan: *UA reflex to Microscopic and Culture ([place]         and [facility] (except [place] and         [place]), Urine Microscopic, ampicillin         (PRINCIPEN) 500 MG capsule            (R82.90) Nonspecific finding on examination of urine  Comment:   Plan: Urine Culture Aerobic Bacterial                  We reviewed:  CMV  Listeria precautions  Zika   Nausea remedies (see AVS for complete instructions)    Recommended PNV daily and following a generally healthy  lifestyle.  Ultrasound at 7 weeks.   To call with protracted n/v or vaginal bleeding  Follow up visit next week with Dr Wise

## 2019-06-07 LAB
BACTERIA SPEC CULT: ABNORMAL
Lab: ABNORMAL
SPECIMEN SOURCE: ABNORMAL

## 2019-06-14 ENCOUNTER — PRENATAL OFFICE VISIT (OUTPATIENT)
Dept: OBGYN | Facility: CLINIC | Age: 24
End: 2019-06-14
Payer: COMMERCIAL

## 2019-06-14 VITALS
DIASTOLIC BLOOD PRESSURE: 73 MMHG | SYSTOLIC BLOOD PRESSURE: 118 MMHG | HEIGHT: 65 IN | HEART RATE: 103 BPM | BODY MASS INDEX: 21.86 KG/M2 | WEIGHT: 131.2 LBS

## 2019-06-14 DIAGNOSIS — Z34.01 NORMAL FIRST PREGNANCY IN FIRST TRIMESTER: Primary | ICD-10-CM

## 2019-06-14 PROBLEM — Z34.00 SUPERVISION OF NORMAL FIRST PREGNANCY: Status: ACTIVE | Noted: 2019-06-14

## 2019-06-14 LAB
ABO + RH BLD: NORMAL
ABO + RH BLD: NORMAL
BLD GP AB SCN SERPL QL: NORMAL
BLOOD BANK CMNT PATIENT-IMP: NORMAL
ERYTHROCYTE [DISTWIDTH] IN BLOOD BY AUTOMATED COUNT: 11.7 % (ref 10–15)
HBV SURFACE AG SERPL QL IA: NONREACTIVE
HCT VFR BLD AUTO: 37.5 % (ref 35–47)
HGB BLD-MCNC: 13.5 G/DL (ref 11.7–15.7)
HIV 1+2 AB+HIV1 P24 AG SERPL QL IA: NONREACTIVE
MCH RBC QN AUTO: 33.3 PG (ref 26.5–33)
MCHC RBC AUTO-ENTMCNC: 36 G/DL (ref 31.5–36.5)
MCV RBC AUTO: 93 FL (ref 78–100)
PLATELET # BLD AUTO: 286 10E9/L (ref 150–450)
RBC # BLD AUTO: 4.05 10E12/L (ref 3.8–5.2)
SPECIMEN EXP DATE BLD: NORMAL
WBC # BLD AUTO: 7.7 10E9/L (ref 4–11)

## 2019-06-14 PROCEDURE — 87340 HEPATITIS B SURFACE AG IA: CPT | Performed by: OBSTETRICS & GYNECOLOGY

## 2019-06-14 PROCEDURE — 86850 RBC ANTIBODY SCREEN: CPT | Performed by: OBSTETRICS & GYNECOLOGY

## 2019-06-14 PROCEDURE — 86900 BLOOD TYPING SEROLOGIC ABO: CPT | Performed by: OBSTETRICS & GYNECOLOGY

## 2019-06-14 PROCEDURE — 85027 COMPLETE CBC AUTOMATED: CPT | Performed by: OBSTETRICS & GYNECOLOGY

## 2019-06-14 PROCEDURE — 87389 HIV-1 AG W/HIV-1&-2 AB AG IA: CPT | Performed by: OBSTETRICS & GYNECOLOGY

## 2019-06-14 PROCEDURE — 87086 URINE CULTURE/COLONY COUNT: CPT | Performed by: OBSTETRICS & GYNECOLOGY

## 2019-06-14 PROCEDURE — 99214 OFFICE O/P EST MOD 30 MIN: CPT | Performed by: OBSTETRICS & GYNECOLOGY

## 2019-06-14 PROCEDURE — 86762 RUBELLA ANTIBODY: CPT | Performed by: OBSTETRICS & GYNECOLOGY

## 2019-06-14 PROCEDURE — 86901 BLOOD TYPING SEROLOGIC RH(D): CPT | Performed by: OBSTETRICS & GYNECOLOGY

## 2019-06-14 PROCEDURE — G0145 SCR C/V CYTO,THINLAYER,RESCR: HCPCS | Performed by: OBSTETRICS & GYNECOLOGY

## 2019-06-14 PROCEDURE — 86780 TREPONEMA PALLIDUM: CPT | Performed by: OBSTETRICS & GYNECOLOGY

## 2019-06-14 PROCEDURE — 36415 COLL VENOUS BLD VENIPUNCTURE: CPT | Performed by: OBSTETRICS & GYNECOLOGY

## 2019-06-14 ASSESSMENT — PATIENT HEALTH QUESTIONNAIRE - PHQ9
SUM OF ALL RESPONSES TO PHQ QUESTIONS 1-9: 4
5. POOR APPETITE OR OVEREATING: NOT AT ALL

## 2019-06-14 ASSESSMENT — ANXIETY QUESTIONNAIRES
1. FEELING NERVOUS, ANXIOUS, OR ON EDGE: NOT AT ALL
3. WORRYING TOO MUCH ABOUT DIFFERENT THINGS: NOT AT ALL
GAD7 TOTAL SCORE: 0
7. FEELING AFRAID AS IF SOMETHING AWFUL MIGHT HAPPEN: NOT AT ALL
2. NOT BEING ABLE TO STOP OR CONTROL WORRYING: NOT AT ALL
IF YOU CHECKED OFF ANY PROBLEMS ON THIS QUESTIONNAIRE, HOW DIFFICULT HAVE THESE PROBLEMS MADE IT FOR YOU TO DO YOUR WORK, TAKE CARE OF THINGS AT HOME, OR GET ALONG WITH OTHER PEOPLE: NOT DIFFICULT AT ALL
6. BECOMING EASILY ANNOYED OR IRRITABLE: NOT AT ALL
5. BEING SO RESTLESS THAT IT IS HARD TO SIT STILL: NOT AT ALL

## 2019-06-14 ASSESSMENT — MIFFLIN-ST. JEOR: SCORE: 1346.61

## 2019-06-14 NOTE — NURSING NOTE
"Chief Complaint   Patient presents with     Prenatal Care       Initial /73 (BP Location: Right arm, Patient Position: Chair, Cuff Size: Adult Regular)   Pulse 103   Ht 1.66 m (5' 5.35\")   Wt 59.5 kg (131 lb 3.2 oz)   LMP 04/10/2019 (Exact Date)   BMI 21.60 kg/m   Estimated body mass index is 21.81 kg/m  as calculated from the following:    Height as of 19: 1.66 m (5' 5.35\").    Weight as of 19: 60.1 kg (132 lb 8 oz).  BP completed using cuff size: regular        The following HM Due: pap smear  [place]al[place]dia (13-24)      The following patient reported/Care Every where data was sent to:  P ABSTRACT QUALITY INITIATIVES [[id number]]       patient has appointment for today       Terri Epps CMA  2019    "

## 2019-06-15 LAB
BACTERIA SPEC CULT: NO GROWTH
RUBV IGG SERPL IA-ACNC: 63 IU/ML
SPECIMEN SOURCE: NORMAL
T PALLIDUM AB SER QL: NONREACTIVE

## 2019-06-15 ASSESSMENT — ANXIETY QUESTIONNAIRES: GAD7 TOTAL SCORE: 0

## 2019-06-19 LAB
COPATH REPORT: NORMAL
PAP: NORMAL

## 2019-06-21 ENCOUNTER — OFFICE VISIT (OUTPATIENT)
Dept: OBGYN | Facility: CLINIC | Age: 24
End: 2019-06-21
Payer: COMMERCIAL

## 2019-06-21 ENCOUNTER — TELEPHONE (OUTPATIENT)
Dept: OBGYN | Facility: CLINIC | Age: 24
End: 2019-06-21

## 2019-06-21 VITALS
WEIGHT: 133.2 LBS | BODY MASS INDEX: 21.93 KG/M2 | DIASTOLIC BLOOD PRESSURE: 74 MMHG | SYSTOLIC BLOOD PRESSURE: 126 MMHG | HEART RATE: 97 BPM | OXYGEN SATURATION: 97 %

## 2019-06-21 DIAGNOSIS — R30.0 DYSURIA: Primary | ICD-10-CM

## 2019-06-21 DIAGNOSIS — R82.90 NONSPECIFIC FINDING ON EXAMINATION OF URINE: ICD-10-CM

## 2019-06-21 DIAGNOSIS — O23.41 RECURRENT URINARY TRACT INFECTION AFFECTING PREGNANCY IN FIRST TRIMESTER: ICD-10-CM

## 2019-06-21 LAB
ALBUMIN UR-MCNC: NEGATIVE MG/DL
APPEARANCE UR: ABNORMAL
BACTERIA #/AREA URNS HPF: ABNORMAL /HPF
BILIRUB UR QL STRIP: NEGATIVE
COLOR UR AUTO: YELLOW
GLUCOSE UR STRIP-MCNC: NEGATIVE MG/DL
HGB UR QL STRIP: ABNORMAL
KETONES UR STRIP-MCNC: NEGATIVE MG/DL
LEUKOCYTE ESTERASE UR QL STRIP: ABNORMAL
NITRATE UR QL: NEGATIVE
NON-SQ EPI CELLS #/AREA URNS LPF: ABNORMAL /LPF
PH UR STRIP: 7 PH (ref 5–7)
RBC #/AREA URNS AUTO: ABNORMAL /HPF
SOURCE: ABNORMAL
SP GR UR STRIP: 1.01 (ref 1–1.03)
UROBILINOGEN UR STRIP-ACNC: 0.2 EU/DL (ref 0.2–1)
WBC #/AREA URNS AUTO: >100 /HPF
WBC CLUMPS #/AREA URNS HPF: PRESENT /HPF

## 2019-06-21 PROCEDURE — 81001 URINALYSIS AUTO W/SCOPE: CPT | Performed by: OBSTETRICS & GYNECOLOGY

## 2019-06-21 PROCEDURE — 99214 OFFICE O/P EST MOD 30 MIN: CPT | Performed by: OBSTETRICS & GYNECOLOGY

## 2019-06-21 PROCEDURE — 87186 SC STD MICRODIL/AGAR DIL: CPT | Performed by: OBSTETRICS & GYNECOLOGY

## 2019-06-21 PROCEDURE — 87088 URINE BACTERIA CULTURE: CPT | Performed by: OBSTETRICS & GYNECOLOGY

## 2019-06-21 PROCEDURE — 87086 URINE CULTURE/COLONY COUNT: CPT | Performed by: OBSTETRICS & GYNECOLOGY

## 2019-06-21 RX ORDER — NITROFURANTOIN 25; 75 MG/1; MG/1
100 CAPSULE ORAL 2 TIMES DAILY
Qty: 28 CAPSULE | Refills: 0 | Status: SHIPPED | OUTPATIENT
Start: 2019-06-21

## 2019-06-21 NOTE — TELEPHONE ENCOUNTER
[facility] Call Center    Phone Message    May a detailed message be left on voicemail: no    Reason for Call: Symptoms or Concerns     If patient has red-flag symptoms, warm transfer to triage line    Current symptom or concern: UTI, bladder infection.  Pt is 10 weeks pregnant.     Symptoms have been present for:  1 week     Dr Wise is patients doctor.  Asking for a call back.     Action Taken: Message routed to:  [facility] p [phone]

## 2019-06-21 NOTE — PATIENT INSTRUCTIONS
If you have any questions regarding your visit, Please contact your care team.  "[employer]: [phone]  [facility] CLINIC HOURS TELEPHONE NUMBER   Cephas Agbeh, M.D. Lisa -       [place]         Monday-[place]    8:00a.m-4:45 p.m    Tuesday--[place]     8:00a.m-4:45 p.m.    Thursday-[place]    8:00a.m-4:45 p.m.    Friday-[place]    8:00a.m-4:45 p.m    [facility]   [address] [place] [address]   [phone]-Ask for [facility]   Fax [phone]   Tezmhsr-[phone]     [facility] Labor [facility] Delivery   [address] [place] [address] [place] [address]   [phone]    [facility]  [address] [facility] [address]  [phone]  Ddorvba-[phone]   Urgent Care locations:    [facility] Monday-Friday  5 pm - 9 pm  Saturday and Sunday   9 am - 5 pm   Monday-Friday   5 pm - 9 pm  Saturday and Sunday  9 am - 5 pm    [phone]   If you need a medication refill, please contact your pharmacy. Please allow 3 business days for your refill to be completed.  As always, Thank you for trusting us with your healthcare needs!

## 2019-06-21 NOTE — TELEPHONE ENCOUNTER
Pt stated she thinks she has an UTI, pt thinks she has another UTI burning, lower abdomen, and urine smells fishy. Writer spoke with Dr. Agbeh, he will see pt today in clinic in [place], pt is headed there now.     Nayana Bran RN on 6/21/2019 at 3:07 PM

## 2019-06-21 NOTE — PROGRESS NOTES
Kimmie is a 23 year old  referred here by self for consultation regarding recurrent UTI.She is about 10 weeks pregnant. Painful urination with odor. She just completed ampicillin abx 9 days ago. Repeat urine culture on 19 was negative. Last positive urine culture on 19 was for E.Coli.  Prior to pregnancy she has been followed by Urology for the same. Renal ultrasound was normal. She was scheduled for MRI ant cystoscopy. Has not followed up anymore..    ROS: Ten point review of systems was reviewed and negative except the above.    Gyne: - abn pap (last pap ), - STD's    Past Medical History:   Diagnosis Date     NO ACTIVE PROBLEMS      History reviewed. No pertinent surgical history.  Patient Active Problem List   Diagnosis     Urinary frequency     Supervision of normal first pregnancy       ALL/Meds: Her medication and allergy histories were reviewed and are documented in their appropriate chart areas.    SH: - tob, - EtOH,     FH: Her family history was reviewed and documented in its appropriate chart area.    PE: /74   Pulse 97   Wt 60.4 kg (133 lb 3.2 oz)   LMP 04/10/2019 (Exact Date)   SpO2 97%   Breastfeeding? No   BMI 21.93 kg/m    Body mass index is 21.93 kg/m .      General:  WNWD female, NAD  Alert  Oriented x 3  Gait:  Normal  Skin:  Normal skin turgor  HEENT:  NC/AT, EOMI  Abdomen:  Mild suprapubic tendernessdistended.  Pelvic exam:  Not performed  Extremities:  No clubbing, no cyanosis and no edema.    Results for orders placed or performed in visit on 19   UA with Microscopic reflex to Culture   Result Value Ref Range    Color Urine Yellow     Appearance Urine Slightly Cloudy     Glucose Urine Negative NEG^Negative mg/dL    Bilirubin Urine Negative NEG^Negative    Ketones Urine Negative NEG^Negative mg/dL    Specific Gravity Urine 1.010 1.003 - 1.035    pH Urine 7.0 5.0 - 7.0 pH    Protein Albumin Urine Negative NEG^Negative mg/dL    Urobilinogen Urine 0.2 0.2 -  1.0 EU/dL    Nitrite Urine Negative NEG^Negative    Blood Urine Trace (A) NEG^Negative    Leukocyte Esterase Urine Moderate (A) NEG^Negative    Source Midstream Urine     WBC Urine >100 (A) OTO5^0 - 5 /HPF    RBC Urine O - 2 OTO2^O - 2 /HPF    WBC Clumps Present (A) NEG^Negative /HPF    Squamous Epithelial /LPF Urine Few FEW^Few /LPF    Bacteria Urine Few (A) NEG^Negative /HPF       A/P      ICD-10-CM    1. Dysuria R30.0 UA with Microscopic reflex to Culture     nitroFURantoin macrocrystal-monohydrate (MACROBID) 100 MG capsule     CANCELED: *UA reflex to Microscopic   2. Nonspecific finding on examination of urine R82.90 Urine Culture Aerobic Bacterial     nitroFURantoin macrocrystal-monohydrate (MACROBID) 100 MG capsule   3. Recurrent urinary tract infection affecting pregnancy in first trimester O23.41        Start abx for 14 days.  May need suppressive therapy for duration of pregnancy.  Recommend follow up with her urologist.    25 minutes was spent face to face with the patient today discussing her history, diagnosis, and follow-up plan as noted above.  Over 50% of the visit was spent in counseling and coordination of care.    Total Visit Time: 30 minutes.    CEPHAS AGBEH, MD.

## 2019-06-23 LAB
BACTERIA SPEC CULT: ABNORMAL
BACTERIA SPEC CULT: ABNORMAL
SPECIMEN SOURCE: ABNORMAL

## 2019-07-08 ENCOUNTER — TELEPHONE (OUTPATIENT)
Dept: NURSING | Facility: CLINIC | Age: 24
End: 2019-07-08

## 2019-07-08 NOTE — TELEPHONE ENCOUNTER
"\"I am 13 weeks pregnant and have been pretty nauseated. I ate breakfast and lunch fine, but then with it being so hot I started getting a headache. I came home took a tylenol, ate a popsicle and threw up the Tylenol.\" Denies other sx. Gave home care advice, call back if needed.  Juhi Guan RN [facility]      "

## 2019-11-07 ENCOUNTER — HEALTH MAINTENANCE LETTER (OUTPATIENT)
Age: 24
End: 2019-11-07

## 2020-11-29 ENCOUNTER — HEALTH MAINTENANCE LETTER (OUTPATIENT)
Age: 25
End: 2020-11-29

## 2021-09-25 ENCOUNTER — HEALTH MAINTENANCE LETTER (OUTPATIENT)
Age: 26
End: 2021-09-25

## 2022-01-15 ENCOUNTER — HEALTH MAINTENANCE LETTER (OUTPATIENT)
Age: 27
End: 2022-01-15

## 2022-12-26 ENCOUNTER — HEALTH MAINTENANCE LETTER (OUTPATIENT)
Age: 27
End: 2022-12-26

## 2023-04-16 ENCOUNTER — HEALTH MAINTENANCE LETTER (OUTPATIENT)
Age: 28
End: 2023-04-16

## 2023-07-18 ENCOUNTER — LAB REQUISITION (OUTPATIENT)
Dept: LAB | Facility: CLINIC | Age: 28
End: 2023-07-18
Payer: COMMERCIAL

## 2023-07-18 DIAGNOSIS — Z12.4 ENCOUNTER FOR SCREENING FOR MALIGNANT NEOPLASM OF CERVIX: ICD-10-CM

## 2023-07-18 DIAGNOSIS — Z13.220 ENCOUNTER FOR SCREENING FOR LIPOID DISORDERS: ICD-10-CM

## 2023-07-18 LAB
CHOLEST SERPL-MCNC: 159 MG/DL
HDLC SERPL-MCNC: 53 MG/DL
LDLC SERPL CALC-MCNC: 91 MG/DL
NONHDLC SERPL-MCNC: 106 MG/DL
TRIGL SERPL-MCNC: 75 MG/DL

## 2023-07-18 PROCEDURE — 80061 LIPID PANEL: CPT | Mod: ORL | Performed by: ADVANCED PRACTICE MIDWIFE

## 2023-07-18 PROCEDURE — G0145 SCR C/V CYTO,THINLAYER,RESCR: HCPCS | Mod: ORL | Performed by: ADVANCED PRACTICE MIDWIFE

## 2023-07-18 PROCEDURE — 87624 HPV HI-RISK TYP POOLED RSLT: CPT | Mod: ORL | Performed by: ADVANCED PRACTICE MIDWIFE

## 2023-07-24 LAB
BKR LAB AP GYN ADEQUACY: ABNORMAL
BKR LAB AP GYN INTERPRETATION: ABNORMAL
BKR LAB AP HPV REFLEX: ABNORMAL
BKR LAB AP LMP: ABNORMAL
BKR LAB AP PREVIOUS ABNL DX: ABNORMAL
BKR LAB AP PREVIOUS ABNORMAL: ABNORMAL
PATH REPORT.COMMENTS IMP SPEC: ABNORMAL
PATH REPORT.COMMENTS IMP SPEC: ABNORMAL
PATH REPORT.RELEVANT HX SPEC: ABNORMAL

## 2023-07-24 PROCEDURE — 88141 CYTOPATH C/V INTERPRET: CPT | Performed by: PATHOLOGY

## 2023-07-26 LAB
HUMAN PAPILLOMA VIRUS 16 DNA: NEGATIVE
HUMAN PAPILLOMA VIRUS 18 DNA: NEGATIVE
HUMAN PAPILLOMA VIRUS FINAL DIAGNOSIS: NORMAL
HUMAN PAPILLOMA VIRUS OTHER HR: NEGATIVE

## 2023-11-20 ENCOUNTER — LAB REQUISITION (OUTPATIENT)
Dept: LAB | Facility: CLINIC | Age: 28
End: 2023-11-20
Payer: COMMERCIAL

## 2023-11-20 DIAGNOSIS — Z36.9 ENCOUNTER FOR ANTENATAL SCREENING, UNSPECIFIED: ICD-10-CM

## 2023-11-20 LAB
BASOPHILS # BLD AUTO: 0 10E3/UL (ref 0–0.2)
BASOPHILS NFR BLD AUTO: 0 %
EOSINOPHIL # BLD AUTO: 0.2 10E3/UL (ref 0–0.7)
EOSINOPHIL NFR BLD AUTO: 2 %
ERYTHROCYTE [DISTWIDTH] IN BLOOD BY AUTOMATED COUNT: 11.9 % (ref 10–15)
HCT VFR BLD AUTO: 36.8 % (ref 35–47)
HGB BLD-MCNC: 13.2 G/DL (ref 11.7–15.7)
IMM GRANULOCYTES # BLD: 0.1 10E3/UL
IMM GRANULOCYTES NFR BLD: 1 %
LYMPHOCYTES # BLD AUTO: 1.6 10E3/UL (ref 0.8–5.3)
LYMPHOCYTES NFR BLD AUTO: 15 %
MCH RBC QN AUTO: 32.9 PG (ref 26.5–33)
MCHC RBC AUTO-ENTMCNC: 35.9 G/DL (ref 31.5–36.5)
MCV RBC AUTO: 92 FL (ref 78–100)
MONOCYTES # BLD AUTO: 0.8 10E3/UL (ref 0–1.3)
MONOCYTES NFR BLD AUTO: 8 %
NEUTROPHILS # BLD AUTO: 7.9 10E3/UL (ref 1.6–8.3)
NEUTROPHILS NFR BLD AUTO: 74 %
NRBC # BLD AUTO: 0 10E3/UL
NRBC BLD AUTO-RTO: 0 /100
PLATELET # BLD AUTO: 302 10E3/UL (ref 150–450)
RBC # BLD AUTO: 4.01 10E6/UL (ref 3.8–5.2)
WBC # BLD AUTO: 10.6 10E3/UL (ref 4–11)

## 2023-11-20 PROCEDURE — 80081 OBSTETRIC PANEL INC HIV TSTG: CPT | Mod: ORL | Performed by: NURSE PRACTITIONER

## 2023-11-20 PROCEDURE — 86803 HEPATITIS C AB TEST: CPT | Mod: ORL | Performed by: NURSE PRACTITIONER

## 2023-11-20 PROCEDURE — 87086 URINE CULTURE/COLONY COUNT: CPT | Mod: ORL | Performed by: NURSE PRACTITIONER

## 2023-11-21 LAB
ABO/RH(D): NORMAL
ANTIBODY SCREEN: NEGATIVE
BACTERIA UR CULT: NORMAL
HBV SURFACE AG SERPL QL IA: NONREACTIVE
HCV AB SERPL QL IA: NONREACTIVE
HIV 1+2 AB+HIV1 P24 AG SERPL QL IA: NONREACTIVE
RPR SER QL: NONREACTIVE
RUBV IGG SERPL QL IA: 7 INDEX
RUBV IGG SERPL QL IA: POSITIVE
SPECIMEN EXPIRATION DATE: NORMAL

## 2023-12-07 ENCOUNTER — LAB REQUISITION (OUTPATIENT)
Dept: LAB | Facility: CLINIC | Age: 28
End: 2023-12-07
Payer: COMMERCIAL

## 2023-12-07 DIAGNOSIS — R30.0 DYSURIA: ICD-10-CM

## 2023-12-07 PROCEDURE — 87086 URINE CULTURE/COLONY COUNT: CPT | Mod: ORL | Performed by: NURSE PRACTITIONER

## 2023-12-08 LAB — BACTERIA UR CULT: NORMAL

## 2023-12-20 ENCOUNTER — LAB REQUISITION (OUTPATIENT)
Dept: LAB | Facility: CLINIC | Age: 28
End: 2023-12-20
Payer: COMMERCIAL

## 2023-12-20 DIAGNOSIS — R30.0 DYSURIA: ICD-10-CM

## 2023-12-20 DIAGNOSIS — Z87.59 PERSONAL HISTORY OF OTHER COMPLICATIONS OF PREGNANCY, CHILDBIRTH AND THE PUERPERIUM: ICD-10-CM

## 2023-12-20 LAB
ALBUMIN MFR UR ELPH: 9.3 MG/DL
CREAT UR-MCNC: 66.6 MG/DL
ERYTHROCYTE [DISTWIDTH] IN BLOOD BY AUTOMATED COUNT: 12.5 % (ref 10–15)
HCT VFR BLD AUTO: 38.8 % (ref 35–47)
HGB BLD-MCNC: 13.4 G/DL (ref 11.7–15.7)
MCH RBC QN AUTO: 33.3 PG (ref 26.5–33)
MCHC RBC AUTO-ENTMCNC: 34.5 G/DL (ref 31.5–36.5)
MCV RBC AUTO: 96 FL (ref 78–100)
PLATELET # BLD AUTO: 299 10E3/UL (ref 150–450)
PROT/CREAT 24H UR: 0.14 MG/MG CR (ref 0–0.2)
RBC # BLD AUTO: 4.03 10E6/UL (ref 3.8–5.2)
WBC # BLD AUTO: 11.8 10E3/UL (ref 4–11)

## 2023-12-20 PROCEDURE — 84156 ASSAY OF PROTEIN URINE: CPT | Mod: ORL | Performed by: ADVANCED PRACTICE MIDWIFE

## 2023-12-20 PROCEDURE — 84450 TRANSFERASE (AST) (SGOT): CPT | Mod: ORL | Performed by: ADVANCED PRACTICE MIDWIFE

## 2023-12-20 PROCEDURE — 84460 ALANINE AMINO (ALT) (SGPT): CPT | Mod: ORL | Performed by: ADVANCED PRACTICE MIDWIFE

## 2023-12-20 PROCEDURE — 82565 ASSAY OF CREATININE: CPT | Mod: ORL | Performed by: ADVANCED PRACTICE MIDWIFE

## 2023-12-20 PROCEDURE — 87086 URINE CULTURE/COLONY COUNT: CPT | Mod: ORL | Performed by: ADVANCED PRACTICE MIDWIFE

## 2023-12-20 PROCEDURE — 85027 COMPLETE CBC AUTOMATED: CPT | Mod: ORL | Performed by: ADVANCED PRACTICE MIDWIFE

## 2023-12-21 LAB
ALT SERPL W P-5'-P-CCNC: 12 U/L (ref 0–50)
AST SERPL W P-5'-P-CCNC: 14 U/L (ref 0–45)
CREAT SERPL-MCNC: 0.51 MG/DL (ref 0.51–0.95)
EGFRCR SERPLBLD CKD-EPI 2021: >90 ML/MIN/1.73M2

## 2023-12-22 LAB — BACTERIA UR CULT: NORMAL

## 2024-02-08 NOTE — PROGRESS NOTES
23 year old  at 9w2d presents for New OB appointment.  Estimated Date of Delivery: Ja 15, 2020 by LMP.  This is consistent with ultrasound at 7 1/7 weeks. Care Everywhere reviewed.      Nausea persists despite zofran, etc.  She has not used much zofran because she seems to vomit about 30 min after taking it.  She is using the unisom at night and the vit b6 occasionally. She has tried the accupressure wrist bands and silvia, but nothing helps. She is able to stay hydrated most days.  She has lost almost 10 lbs in a little over a month.     No vaginal bleeding, no leaking fluid, no contractions.      E Coli UTI 19.  She complete the ampicillin a couple days ago.  She feels her symptoms are improved.  She was able to keep the medicine down for the most part.      Electronic chart, including labs and imaging reviewed.    Last PHQ-9 score on record=   PHQ-9 SCORE 2019   PHQ-9 Total Score 4       Obstetric History  OB History    Para Term  AB Living   1 0 0 0 0 0   SAB TAB Ectopic Multiple Live Births   0 0 0 0 0      # Outcome Date GA Lbr Álvaro/2nd Weight Sex Delivery Anes PTL Lv   1 Current                  Most Recent Immunizations   Administered Date(s) Administered     DTAP (<7y) 1999     HepB 1994     Hib (PRP-T) 1995     Historical DTP/aP 05/15/1995     MMR 2007     OPV, trivalent, live 1999     Tdap (Adacel,Boostrix) 2007     Varicella 2007        Patient Active Problem List   Diagnosis     Urinary frequency     Supervision of normal first pregnancy       Current Outpatient Medications   Medication     ondansetron (ZOFRAN-ODT) 4 MG ODT tab     Prenatal Vit-Fe Fumarate-FA (PRENATAL MULTIVITAMIN W/IRON) 27-0.8 MG tablet     No current facility-administered medications for this visit.        No Known Allergies    History  Past Medical History:   Diagnosis Date     NO ACTIVE PROBLEMS      History reviewed. No pertinent surgical history.    Social  "History     Socioeconomic History     Marital status:      Spouse name: Not on file     Number of children: Not on file     Years of education: Not on file     Highest education level: Not on file   Occupational History     Not on file   Social Needs     Financial resource strain: Not on file     Food insecurity:     Worry: Not on file     Inability: Not on file     Transportation needs:     Medical: Not on file     Non-medical: Not on file   Tobacco Use     Smoking status: Never Smoker     Smokeless tobacco: Never Used   Substance and Sexual Activity     Alcohol use: Not Currently     Drug use: Never     Sexual activity: Yes     Partners: Male     Birth control/protection: None   Lifestyle     Physical activity:     Days per week: Not on file     Minutes per session: Not on file     Stress: Not on file   Relationships     Social connections:     Talks on phone: Not on file     Gets together: Not on file     Attends Islam service: Not on file     Active member of club or organization: Not on file     Attends meetings of clubs or organizations: Not on file     Relationship status: Not on file     Intimate partner violence:     Fear of current or ex partner: Not on file     Emotionally abused: Not on file     Physically abused: Not on file     Forced sexual activity: Not on file   Other Topics Concern     Parent/sibling w/ CABG, MI or angioplasty before 65F 55M? No   Social History Narrative     Not on file       ROS - Please see HPI, otherwise 10pt ROS negative.      Physical Exam  Vitals: /73 (BP Location: Right arm, Patient Position: Chair, Cuff Size: Adult Regular)   Pulse 103   Ht 1.66 m (5' 5.35\")   Wt 59.5 kg (131 lb 3.2 oz)   LMP 04/10/2019 (Exact Date)   BMI 21.60 kg/m    BMI= Body mass index is 21.6 kg/m .  Gen: Alert and oriented times 3, no acute distress.  Well developed, well nourished, pleasant.    Neck: Supple, no masses.  No thyromegaly.  Chest:  Non labored.  Clear to " auscultation bilaterally.    Heart: Regular, normal S1, S2.  No murmurs.   Abdomen: Soft, nontender, nondistended.  No palpable masses.    :  Normal female external genitalia, Pavel stage V.  No lesions.  Speculum exam reveals a normal vaginal vault, normal cervix.  No abnormal discharge.  Bimanual exam reveals a normal, mobile, nontender uterus, size consistent with dates.  No cervical motion tenderness.  Adnexa nontender with no palpable masses.   Extremities:  Nontender, no edema.    Pap obtained Yes      Assessment and Plan:  23 year old  with IUP at 9w2d.        ICD-10-CM    1. Normal first pregnancy in first trimester Z34.01 ABO/Rh type and screen     Hepatitis B surface antigen     CBC with platelets     HIV Antigen Antibody Combo     Rubella Antibody IgG Quantitative     Treponema Abs w Reflex to RPR and Titer     Pap imaged thin layer screen reflex to HPV if ASCUS - recommend age 25 - 29     Urine Culture Aerobic Bacterial     E coli UTI - repeat culture today and every trimester.   Discussed genetic screening options:  She will let me know what she decides.  She understands the time constraints.  Handouts given from [employer].    Ordered labs and discussed ultrasound.  Folder given, outlining physician coverage, exercise, weight gain, schedule of visits, routine and indicated ultrasounds, prenatal vitamins and childbirth education.    Body mass index is 21.6 kg/m . - recommend 25-35 lbs (BMI 18.5-24.9)  Discussed supportive care for N&V.  Reportable signs and symptoms discussed.  She may need treatment at the infusion center.    Return in 4 weeks for routine OB care      35 minutes was spent face to face with the patient today discussing her history, diagnosis, and follow-up plan as noted above.  Over 50% of the visit was spent in counseling and coordination of care.    Jasmina Wise MD FACOG     independent

## 2024-04-10 ENCOUNTER — LAB REQUISITION (OUTPATIENT)
Dept: LAB | Facility: CLINIC | Age: 29
End: 2024-04-10
Payer: COMMERCIAL

## 2024-04-10 DIAGNOSIS — Z01.83 ENCOUNTER FOR BLOOD TYPING: ICD-10-CM

## 2024-04-10 PROCEDURE — 86850 RBC ANTIBODY SCREEN: CPT | Mod: ORL | Performed by: ADVANCED PRACTICE MIDWIFE

## 2024-04-11 LAB
ANTIBODY SCREEN: NEGATIVE
SPECIMEN EXPIRATION DATE: NORMAL

## 2024-09-17 ENCOUNTER — LAB REQUISITION (OUTPATIENT)
Dept: LAB | Facility: CLINIC | Age: 29
End: 2024-09-17
Payer: COMMERCIAL

## 2024-09-17 DIAGNOSIS — Z12.4 ENCOUNTER FOR SCREENING FOR MALIGNANT NEOPLASM OF CERVIX: ICD-10-CM

## 2024-09-17 PROCEDURE — 88175 CYTOPATH C/V AUTO FLUID REDO: CPT | Mod: ORL | Performed by: ADVANCED PRACTICE MIDWIFE

## 2024-09-17 PROCEDURE — 87624 HPV HI-RISK TYP POOLED RSLT: CPT | Mod: ORL | Performed by: ADVANCED PRACTICE MIDWIFE

## 2024-09-18 LAB
HPV HR 12 DNA CVX QL NAA+PROBE: NEGATIVE
HPV16 DNA CVX QL NAA+PROBE: NEGATIVE
HPV18 DNA CVX QL NAA+PROBE: NEGATIVE
HUMAN PAPILLOMA VIRUS FINAL DIAGNOSIS: NORMAL

## 2024-09-23 LAB
BKR AP ASSOCIATED HPV REPORT: NORMAL
BKR LAB AP GYN ADEQUACY: NORMAL
BKR LAB AP GYN INTERPRETATION: NORMAL
BKR LAB AP LMP: NORMAL
BKR LAB AP PREVIOUS ABNL DX: NORMAL
BKR LAB AP PREVIOUS ABNORMAL: NORMAL
PATH REPORT.COMMENTS IMP SPEC: NORMAL
PATH REPORT.COMMENTS IMP SPEC: NORMAL
PATH REPORT.RELEVANT HX SPEC: NORMAL